# Patient Record
Sex: MALE | Race: WHITE | HISPANIC OR LATINO | URBAN - METROPOLITAN AREA
[De-identification: names, ages, dates, MRNs, and addresses within clinical notes are randomized per-mention and may not be internally consistent; named-entity substitution may affect disease eponyms.]

---

## 2017-04-19 ENCOUNTER — HOSPITAL ENCOUNTER (EMERGENCY)
Facility: MEDICAL CENTER | Age: 47
End: 2017-04-19
Attending: EMERGENCY MEDICINE
Payer: COMMERCIAL

## 2017-04-19 ENCOUNTER — APPOINTMENT (OUTPATIENT)
Dept: RADIOLOGY | Facility: MEDICAL CENTER | Age: 47
End: 2017-04-19
Attending: EMERGENCY MEDICINE
Payer: COMMERCIAL

## 2017-04-19 VITALS
DIASTOLIC BLOOD PRESSURE: 96 MMHG | RESPIRATION RATE: 16 BRPM | SYSTOLIC BLOOD PRESSURE: 151 MMHG | HEIGHT: 64 IN | WEIGHT: 161.6 LBS | OXYGEN SATURATION: 93 % | HEART RATE: 74 BPM | TEMPERATURE: 98.2 F | BODY MASS INDEX: 27.59 KG/M2

## 2017-04-19 DIAGNOSIS — S93.402A SPRAIN OF LEFT ANKLE, UNSPECIFIED LIGAMENT, INITIAL ENCOUNTER: ICD-10-CM

## 2017-04-19 PROCEDURE — 73610 X-RAY EXAM OF ANKLE: CPT | Mod: LT

## 2017-04-19 PROCEDURE — 99284 EMERGENCY DEPT VISIT MOD MDM: CPT

## 2017-04-19 ASSESSMENT — PAIN SCALES - GENERAL
PAINLEVEL_OUTOF10: 6
PAINLEVEL_OUTOF10: 2

## 2017-04-19 NOTE — DISCHARGE INSTRUCTIONS
Esguince de tobillo  (Ankle Sprain)   Un esguince de tobillo es miguelangel lesión en los tejidos ari y fibrosos (ligamentos) que mantienen unidos los huesos de la articulación del tobillo.   CAUSAS   Las causas pueden ser miguelangel caída o la torcedura del tobillo. Los esguinces de tobillo ocurren con más frecuencia al pisar con el borde exterior del pie, lo que hace que el tobillo se vuelva hacia adentro. Las personas que practican deportes son más propensas a pradeep tipo de lesiones.   SÍNTOMAS   · Dolor en el tobillo. El dolor puede aparecer laisha el reposo o sólo al tratar de ponerse de pie o caminar.  · Hinchazón.  · Hematomas. Los hematomas pueden aparecer inmediatamente o luego de 1 a 2 días después de la lesión.  · Dificultad para pararse o caminar, especialmente al doblar en esquinas o al cambiar de dirección.  DIAGNÓSTICO   El médico le preguntará detalles acerca de la lesión y le hará un examen físico del tobillo para determinar si tiene un esguince. Laisha el examen físico, el médico apretará y aplicará presión en áreas específicas del pie y del tobillo. El médico tratará de  el tobillo en ciertas direcciones. Le indicarán miguelangel radiografía para descartar la fractura de un hueso o que un ligamento no se haya separado de kym de los huesos del tobillo (fractura por avulsión).   TRATAMIENTO   Algunos tipos de soporte podrán ayudarlo a estabilizar el tobillo. El profesional que lo asiste le dará las indicaciones. También podrá indicarle que use medicamentos para calmar el dolor. Si el esguince es grave, mcleod médico podrá derivarlo a un cirujano que lo ayudará a recuperar la función de las partes afectadas del sistema esquelético (ortopedista) o a un fisioterapeuta.   INSTRUCCIONES PARA EL CUIDADO EN EL HOGAR   · Aplique hielo en la articulación lesionada laisha 1 ó 2 días o según lo que le indique mcleod médico. La aplicación del hielo ayuda a reducir la inflamación y el dolor.  ¨ Ponga el hielo en miguelangel bolsa  plástica.  ¨ Colóquese miguelangel toalla entre la piel y la bolsa de hielo.  ¨ Deje el hielo en el lugar laisha 15 a 20 minutos por vez, cada 2 horas mientras esté despierto.  · Sólo tome medicamentos de venta steve o recetados para calmar el dolor, las molestias o bajar la fiebre según las indicaciones de mcleod médico.  · Eleve el tobillo lesionado por encima del nivel del corazón tanto rajan pueda laisha 2 o 3 días.  · Si mcleod médico le indica el uso de muletas, úselas según las instrucciones. Gradualmente lleve el peso sobre el tobillo afectado. Siga usando muletas o un bastón hasta que pueda caminar sin sentir dolor en el tobillo.  · Si tiene miguelangel férula de yeso, úsela rajan lo indique mcleod médico. No se apoye en ninguna cosa más dura que miguelangel almohada laisha las primeras 24 horas. No ponga peso sobre la férula. No permita que se moje. Puede quitársela para jessika miguelangel ducha o un baño.  · Pueden haberle colocado un vendaje elástico para usar alrededor del tobillo para darle soporte. Si el vendaje elástico está muy ajustado (siente adormecimiento u hormigueo o el pie está frío y aleta), ajústelo para que sea más cómodo.  · Si usted tiene miguelangel férula de aire, puede soplar o dejar salir el aire para que sea más cómodo. Puede quitarse la férula por la noche y antes de jessika miguelangel ducha o un baño. Mueva los dedos de los pies en la férula varias veces al día para disminuir la hinchazón.  SOLICITE ATENCIÓN MÉDICA SI:   · Le aumenta rápidamente el moretón o el hinchazón.  · Los dedos de los pies están extremadamente fríos o pierde la sensibilidad en el pie.  · El dolor no se gladis con los medicamentos.  SOLICITE ATENCIÓN MÉDICA DE INMEDIATO SI:   · Los dedos de los pies están adormecidos o de color aleta.  · Tiene un dolor dangelo que va aumentando.  ASEGÚRESE DE QUE:   · Comprende estas instrucciones.  · Controlará mcleod enfermedad.  · Solicitará ayuda de inmediato si no mejora o empeora.     Esta información no tiene rajan fin reemplazar el  consejo del médico. Asegúrese de hacerle al médico cualquier pregunta que tenga.     Document Released: 12/18/2006 Document Revised: 09/11/2013  Elsevier Interactive Patient Education ©2016 Elsevier Inc.

## 2017-04-19 NOTE — ED AVS SNAPSHOT
Home Care Instructions                                                                                                                Jim Garcia   MRN: [id number]    Department:  [facility], Emergency Dept   Date of Visit:  4/19/2017            [facility], Emergency Dept    [address]    [place] [address]    Phone:  [phone]      You were seen by     Hung Lin M.D.      Your Diagnosis Was     Sprain of left ankle, unspecified ligament, initial encounter     S93.402A       Follow-up Information     1. Follow up with [email or web address] In 4 days.    Why:  for follow up of ankle injury    Contact information    [address]  [place] [address]  [phone]        Medication Information     Review all of your home medications and newly ordered medications with your primary doctor and/or pharmacist as soon as possible. Follow medication instructions as directed by your doctor and/or pharmacist.     Please keep your complete medication list with you and share with your physician. Update the information when medications are discontinued, doses are changed, or new medications (including over-the-counter products) are added; and carry medication information at all times in the event of emergency situations.               Medication List      Notice     You have not been prescribed any medications.            Procedures and tests performed during your visit     CRUTCHES    DX-ANKLE 3+ VIEWS LEFT    SPLINT APPLICATION        Discharge Instructions       Esguince de tobillo  (Ankle Sprain)   Un esguince de tobillo es miguelangel lesión en los tejidos ari y fibrosos (ligamentos) que mantienen unidos los huesos de la articulación del tobillo.   CAUSAS   Las causas pueden ser miguelangel caída o la torcedura del tobillo. Los esguinces de tobillo ocurren con más frecuencia al pisar con el borde exterior del pie, lo que hace que el tobillo se vuelva hacia adentro. Las personas que  practican deportes son más propensas a pradeep tipo de lesiones.   SÍNTOMAS   · Dolor en el tobillo. El dolor puede aparecer laisha el reposo o sólo al tratar de ponerse de pie o caminar.  · Hinchazón.  · Hematomas. Los hematomas pueden aparecer inmediatamente o luego de 1 a 2 días después de la lesión.  · Dificultad para pararse o caminar, especialmente al doblar en esquinas o al cambiar de dirección.  DIAGNÓSTICO   El médico le preguntará detalles acerca de la lesión y le hará un examen físico del tobillo para determinar si tiene un esguince. Laisha el examen físico, el médico apretará y aplicará presión en áreas específicas del pie y del tobillo. El médico tratará de  el tobillo en ciertas direcciones. Le indicarán miguelangel radiografía para descartar la fractura de un hueso o que un ligamento no se haya separado de kym de los huesos del tobillo (fractura por avulsión).   TRATAMIENTO   Algunos tipos de soporte podrán ayudarlo a estabilizar el tobillo. El profesional que lo asiste le dará las indicaciones. También podrá indicarle que use medicamentos para calmar el dolor. Si el esguince es grave, mcleod médico podrá derivarlo a un cirujano que lo ayudará a recuperar la función de las partes afectadas del sistema esquelético (ortopedista) o a un fisioterapeuta.   INSTRUCCIONES PARA EL CUIDADO EN EL HOGAR   · Aplique hielo en la articulación lesionada laisha 1 ó 2 días o según lo que le indique mcleod médico. La aplicación del hielo ayuda a reducir la inflamación y el dolor.  ¨ Ponga el hielo en miguelangel bolsa plástica.  ¨ Colóquese miguelangel toalla entre la piel y la bolsa de hielo.  ¨ Deje el hielo en el lugar laisha 15 a 20 minutos por vez, cada 2 horas mientras esté despierto.  · Sólo tome medicamentos de venta steve o recetados para calmar el dolor, las molestias o bajar la fiebre según las indicaciones de mcleod médico.  · Eleve el tobillo lesionado por encima del nivel del corazón tanto rajan pueda laisha 2 o 3 días.  · Si mcleod  médico le indica el uso de muletas, úselas según las instrucciones. Gradualmente lleve el peso sobre el tobillo afectado. Siga usando muletas o un bastón hasta que pueda caminar sin sentir dolor en el tobillo.  · Si tiene miguelangel férula de yeso, úsela rajan lo indique mcleod médico. No se apoye en ninguna cosa más dura que miguelangel almohada laisha las primeras 24 horas. No ponga peso sobre la férula. No permita que se moje. Puede quitársela para jessika miguelangel ducha o un baño.  · Pueden haberle colocado un vendaje elástico para usar alrededor del tobillo para darle soporte. Si el vendaje elástico está muy ajustado (siente adormecimiento u hormigueo o el pie está frío y aleta), ajústelo para que sea más cómodo.  · Si usted tiene miguelangel férula de aire, puede soplar o dejar salir el aire para que sea más cómodo. Puede quitarse la férula por la noche y antes de jessika miguelangel ducha o un baño. Mueva los dedos de los pies en la férula varias veces al día para disminuir la hinchazón.  SOLICITE ATENCIÓN MÉDICA SI:   · Le aumenta rápidamente el moretón o el hinchazón.  · Los dedos de los pies están extremadamente fríos o pierde la sensibilidad en el pie.  · El dolor no se gladis con los medicamentos.  SOLICITE ATENCIÓN MÉDICA DE INMEDIATO SI:   · Los dedos de los pies están adormecidos o de color aleta.  · Tiene un dolor dangelo que va aumentando.  ASEGÚRESE DE QUE:   · Comprende estas instrucciones.  · Controlará mcleod enfermedad.  · Solicitará ayuda de inmediato si no mejora o empeora.     Esta información no tiene rajan fin reemplazar el consejo del médico. Asegúrese de hacerle al médico cualquier pregunta que tenga.     Document Released: 12/18/2006 Document Revised: 09/11/2013  Elsevier Interactive Patient Education ©2016 Elsevier Inc.            Patient Information     Patient Information    Following emergency treatment: all patient requiring follow-up care must return either to a private physician or a clinic if your condition worsens before you are  able to obtain further medical attention, please return to the emergency room.     Billing Information    At [facility], we work to make the billing process streamlined for our patients.  Our Representatives are here to answer any questions you may have regarding your hospital bill.  If you have insurance coverage and have supplied your insurance information to us, we will submit a claim to your insurer on your behalf.  Should you have any questions regarding your bill, we can be reached online or by phone as follows:  Online: You are able pay your bills online or live chat with our representatives about any billing questions you may have. We are here to help Monday - Friday from 8:00am to 7:30pm and 9:00am - 12:00pm on Saturdays.  Please visit [email or web address]  for more information.   Phone:  [phone] or [phone]    Please note that your emergency physician, surgeon, pathologist, radiologist, anesthesiologist, and other specialists are not employed by [facility] and will therefore bill separately for their services.  Please contact them directly for any questions concerning their bills at the numbers below:     Emergency Physician Services:  [phone]  [facility]:  [phone]  [facility]:  [phone]  [facility]:  [phone]    1. Your final bill may vary from the amount quoted upon discharge if all procedures are not complete at that time, or if your doctor has additional procedures of which we are not aware. You will receive an additional bill if you return to the Emergency Department at [facility] for suture removal regardless of the facility of which the sutures were placed.     2. Please arrange for settlement of this account at the emergency registration.    3. All self-pay accounts are due in full at the time of treatment.  If you are unable to meet this obligation then payment is expected within 4-5  days.     4. If you have had radiology studies (CT, X-ray, Ultrasound, MRI), you have received a preliminary result during your emergency department visit. Please contact the radiology department [phone] to receive a copy of your final result. Please discuss the Final result with your primary physician or with the follow up physician provided.     Crisis Hotline:  [place] Crisis Hotline:  0-376-UHRYUFE or 1-957.982.8921  [place] Crisis Hotline:    1-873.935.6540 or 764-047-1843         ED Discharge Follow Up Questions    1. In order to provide you with very good care, we would like to follow up with a phone call in the next few days.  May we have your permission to contact you?     YES /  NO    2. What is the best phone number to call you? (       )_____-__________    3. What is the best time to call you?      Morning  /  Afternoon  /  Evening                   Patient Signature:  ____________________________________________________________    Date:  ____________________________________________________________

## 2017-04-19 NOTE — ED NOTES
.  Chief Complaint   Patient presents with   • Ankle Injury     left ankle injury 4/18   • Ankle Swelling     Ambulated to triage with son above c/c. Twisted ankle at work yesterday.

## 2017-04-19 NOTE — ED AVS SNAPSHOT
Ecal Access Code: [email or web address]  Expires: 5/19/2017  3:34 PM    Your email address is not on file at [facility].  Email Addresses are required for you to sign up for Ecal, please contact [phone] to verify your personal information and to provide your email address prior to attempting to register for Ecal.    Jim Garcia  [address] [place] [address]    Ecal  A secure, online tool to manage your health information     [facility]’s Ecal® is a secure, online tool that connects you to your personalized health information from the privacy of your home -- day or night - making it very easy for you to manage your healthcare. Once the activation process is completed, you can even access your medical information using the Ecal farrah, which is available for free in the Apple Farrah store or Google Play store.     To learn more about Ecal, visit [email or web address]    There are two levels of access available (as shown below):   My Chart Features  [facility] Primary Care Doctor [facility]  Specialists [facility]  Urgent  Care Non-[facility] Primary Care Doctor   Email your healthcare team securely and privately 24/7 X X X    Manage appointments: schedule your next appointment; view details of past/upcoming appointments X      Request prescription refills. X      View recent personal medical records, including lab and immunizations X X X X   View health record, including health history, allergies, medications X X X X   Read reports about your outpatient visits, procedures, consult and ER notes X X X X   See your discharge summary, which is a recap of your hospital and/or ER visit that includes your diagnosis, lab results, and care plan X X  X     How to register for Sabret:  Once your e-mail address has been verified, follow the following steps to sign up for Sabret.     1. Go to  [email or web address]  2. Click on the Sign Up Now box, which takes you to the New Member Sign Up page. You  will need to provide the following information:  a. Enter your 20:20 Mobile Access Code exactly as it appears at the top of this page. (You will not need to use this code after you’ve completed the sign-up process. If you do not sign up before the expiration date, you must request a new code.)   b. Enter your date of birth.   c. Enter your home email address.   d. Click Submit, and follow the next screen’s instructions.  3. Create a Kagerat ID. This will be your 20:[employer] Mobile login ID and cannot be changed, so think of one that is secure and easy to remember.  4. Create a 20:20 Mobile password. You can change your password at any time.  5. Enter your Password Reset Question and Answer. This can be used at a later time if you forget your password.   6. Enter your e-mail address. This allows you to receive e-mail notifications when new information is available in 20:[employer] Mobile.  7. Click Sign Up. You can now view your health information.    For assistance activating your 20:[employer] Mobile account, call [phone]

## 2017-04-19 NOTE — ED NOTES
Explained discharge instructions to patient and family, family translating, all questions answered.  Patient encouraged to follow up with PCP, and return to the ER for worsening symptoms.  VSS upon discharge, MD aware of HTN.  Patient ambulated to lobby with steady gait using crutches/boot in place.  Patient confirmed that he had a safe way to get home.

## 2017-04-19 NOTE — ED AVS SNAPSHOT
4/19/2017    Jim Garcia  [address] [place] [address]    Dear Jim:    On license of [facility] wants to ensure your discharge home is safe and you or your loved ones have had all of your questions answered regarding your care after you leave the hospital.    Below is a list of resources and contact information should you have any questions regarding your hospital stay, follow-up instructions, or active medical symptoms.    Questions or Concerns Regarding… Contact   Medical Questions Related to Your Discharge  (7 days a week, 8am-5pm) Contact a Nurse Care Coordinator   [phone]   Medical Questions Not Related to Your Discharge  (24 hours a day / 7 days a week)  Contact the Nurse Health Line   [phone]    Medications or Discharge Instructions Refer to your discharge packet   or contact your [facility] Primary Care Provider   [phone]   Follow-up Appointment(s) Schedule your appointment via [employer]   or contact Scheduling [phone]   Billing Review your statement via [employer]us  or contact Billing [phone]   Medical Records Review your records via [employer]us   or contact Medical Records [phone]     You may receive a telephone call within two days of discharge. This call is to make certain you understand your discharge instructions and have the opportunity to have any questions answered. You can also easily access your medical information, test results and upcoming appointments via the [employer]us free online health management tool. You can learn more and sign up at [email or web address]. For assistance setting up your [employer] account, please call [phone].    Once again, we want to ensure your discharge home is safe and that you have a clear understanding of any next steps in your care. If you have any questions or concerns, please do not hesitate to contact us, we are here for you. Thank you for choosing [facility] for your healthcare needs.    Sincerely,    Your [facility] Healthcare Team

## 2017-04-19 NOTE — LETTER
"  FORM C-4:  EMPLOYEE’S CLAIM FOR COMPENSATION/ REPORT OF INITIAL TREATMENT  EMPLOYEE’S CLAIM - PROVIDE ALL INFORMATION REQUESTED   First Name  Jim Last Name  Jose Birthdate             Age  1970 46 y.o. Sex  male Claim Number   Home Employee Address  [address]                                     Zip  [address] Height  1.626 m (5' 4\") Weight  73.3 kg (161 lb 9.6 oz) N  [phone]   Mailing Employee Address                           [address]               Zip  [address] Telephone  [phone] (home)  Primary Language Spoken  ENGLISH   Insurer  UNABLE TO OBTAIN Third Party   [employer] Employee's Occupation (Job Title) When Injury or Occupational Disease Occurred       Employer's Name  [employer] Telephone  [phone]    Employer Address  [address] Zip  [address]   Date of Injury  4/19/2017       Hour of Injury  10:00 AM Date Employer Notified  4/19/2017 Last Day of Work after Injury or Occupational Disease  4/19/2017 Supervisor to Whom Injury Reported  [facility]   Address or Location of Accident (if applicable)  UNKNOWN   What were you doing at the time of accident? (if applicable)  WORKING    How did this injury or occupational disease occur? Be specific and answer in detail. Use additional sheet if necessary)  STEPPED INCORECTLY ON CONCRETE SLAB A TWISTED ANKLE   If you believe that you have an occupational disease, when did you first have knowledge of the disability and it relationship to your employment?  NA Witnesses to the Accident  NA     Nature of Injury or Occupational Disease  Sprain  Part(s) of Body Injured or Affected  Ankle (L), N/A, N/A    I certify that the above is true and correct to the best of my knowledge and that I have provided this information in order to obtain the benefits of [place]’s [place] " [place] inclusive or Chapter 617 of [place]RS).  I hereby authorize any physician, chiropractor, surgeon, practitioner, or other person, any hospital, including [facility] hospital, any medical service organization, any insurance company, or other institution or organization to release to each other, any medical or other information, including benefits paid or payable, pertinent to this injury or disease, except information relative to diagnosis, treatment and/or counseling for AIDS, psychological conditions, alcohol or controlled substances, for which I must give specific authorization.  A Photostat of this authorization shall be as valid as the original.   Date Place  [facility] Employee’s Signature   THIS REPORT MUST BE COMPLETED AND MAILED WITHIN 3 WORKING DAYS OF TREATMENT   Place  [facility], EMERGENCY DEPT  Name of Facility   [facility]   Date  4/19/2017 Diagnosis  (S93.402A) Sprain of left ankle, unspecified ligament, initial encounter Is there evidence the injured employee was under the influence of alcohol and/or another controlled substance at the time of accident?   Hour  2:40 PM Description of Injury or Disease  Sprain of left ankle, unspecified ligament, initial encounter No   Treatment  Physician evaluation and treatment of left ankle sprain  Have you advised the patient to remain off work five days or more?         No   X-Ray Findings  Negative  Comments:no fracture to the left ankle   If Yes   From Date    To Date      From information given by the employee, together with medical evidence, can you directly connect this injury or occupational disease as job incurred?  Yes If No, is the employee capable of: Full Duty  No Modified Duty  Yes   Is additional medical care by a physician indicated?  Yes  Comments:follow-up with occupational health If Modified Duty, Specify any Limitations / Restrictions  No  "full weight-bearing on left ankle until cleared by occupational health     Do you know of any previous injury or disease contributing to this condition or occupational disease?  No   Date  4/19/2017 Print Doctor’s Name  Hung Lin I certify the employer’s copy of this form was mailed on:   Address  [address] [address] [phone] Insurer’s Use Only   [facility]  [address]    Provider’s Tax ID Number  [id number] Telephone  Dept: [phone]    Doctor’s Signature  e-HUNG Martinez M.D. Degree   MD    Original - TREATING PHYSICIAN OR CHIROPRACTOR   Pg 2-Insurer/TPA   Pg 3-Employer   Pg 4-Employee                                                                                                  Form C-4 (rev01/03)     BRIEF DESCRIPTION OF RIGHTS AND BENEFITS  (Pursuant to NRS 616C.050)    Notice of Injury or Occupational Disease (Incident Report Form C-1): If an injury or occupational disease (OD) arises out of and in the course of employment, you must provide written notice to your employer as soon as practicable, but no later than 7 days after the accident or OD. Your employer shall maintain a sufficient supply of the required forms.    Claim for Compensation (Form C-4): If medical treatment is sought, the form C-4 is available at the place of initial treatment. A completed \"Claim for Compensation\" (Form C-4) must be filed within 90 days after an accident or OD. The treating physician or chiropractor must, within 3 working days after treatment, complete and mail to the employer, the employer's insurer and third-party , the Claim for Compensation.    Medical Treatment: If you require medical treatment for your on-the-job injury or OD, you may be required to select a physician or chiropractor from a list provided by your workers’ compensation insurer, if it has contracted with an Organization for Managed Care (MCO) or Preferred Provider Organization (PPO) or " providers of health care. If your employer has not entered into a contract with an MCO or PPO, you may select a physician or chiropractor from the Panel of Physicians and Chiropractors. Any medical costs related to your industrial injury or OD will be paid by your insurer.    Temporary Total Disability (TTD): If your doctor has certified that you are unable to work for a period of at least 5 consecutive days, or 5 cumulative days in a 20-day period, or places restrictions on you that your employer does not accommodate, you may be entitled to TTD compensation.    Temporary Partial Disability (TPD): If the wage you receive upon reemployment is less than the compensation for TTD to which you are entitled, the insurer may be required to pay you TPD compensation to make up the difference. TPD can only be paid for a maximum of 24 months.    Permanent Partial Disability (PPD): When your medical condition is stable and there is an indication of a PPD as a result of your injury or OD, within 30 days, your insurer must arrange for an evaluation by a rating physician or chiropractor to determine the degree of your PPD. The amount of your PPD award depends on the date of injury, the results of the PPD evaluation and your age and wage.    Permanent Total Disability (PTD): If you are medically certified by a treating physician or chiropractor as permanently and totally disabled and have been granted a PTD status by your insurer, you are entitled to receive monthly benefits not to exceed 66 2/3% of your average monthly wage. The amount of your PTD payments is subject to reduction if you previously received a PPD award.    Vocational Rehabilitation Services: You may be eligible for vocational rehabilitation services if you are unable to return to the job due to a permanent physical impairment or permanent restrictions as a result of your injury or occupational disease.    Transportation and Per Deborah Reimbursement: You may be  eligible for travel expenses and per jason associated with medical treatment.  Reopening: You may be able to reopen your claim if your condition worsens after claim closure.    Appeal Process: If you disagree with a written determination issued by the insurer or the insurer does not respond to your request, you may appeal to the Department of Administration, , by following the instructions contained in your determination letter. You must appeal the determination within 70 days from the date of the determination letter at [address], [address], [place] [address], or [address], [place], [place] [address]. If you disagree with the  decision, you may appeal to the Department of Administration, . You must file your appeal within 30 days from the date of the  decision letter at [address], [address], [place], [place] [address], or [address] [facility] [address], [place], [place] [address]. If you disagree with a decision of an , you may file a petition for judicial review with the District Court. You must do so within 30 days of the Appeal Officer’s decision. You may be represented by an  at your own expense or you may contact the [facility] for possible representation.    [place]  for Injured Workers (NAIW): If you disagree with a  decision, you may request that NAIW represent you without charge at an  Hearing. For information regarding denial of benefits, you may contact the [facility] at: [address], [address], [place] [address], [phone], or [address], [place] [address], [phone]    To File a Complaint with the Division: If you wish to file a complaint with the  of the Division of Industrial Relations (DIR), please contact the Workers’ Compensation Section, [address], [address], [place],  [place] [address], telephone [phone], or [address], [address], [place] [address], telephone [phone].    For assistance with Workers’ Compensation Issues: you may contact the Office of the Governor Consumer Health Assistance, [address] [place] [address], [address], [place], [place] [address], Toll Free 1-241.255.1423, Web site: http://[facility].[email or web address]., E-mail [email or web address].                                                                                                                                                                               __________________________________________________________________                                    _________________            Employee Name / Signature                                                                                                                            Date                                       D-2 (rev. 10/07)

## 2017-04-19 NOTE — LETTER
"  FORM C-4:  EMPLOYEE’S CLAIM FOR COMPENSATION/ REPORT OF INITIAL TREATMENT  EMPLOYEE’S CLAIM - PROVIDE ALL INFORMATION REQUESTED   First Name  Jim Last Name  Jose Birthdate             Age  1970 46 y.o. Sex  male Claim Number   Home Employee Address  [address]                                     Zip  [address] Height  1.626 m (5' 4\") Weight  73.3 kg (161 lb 9.6 oz) N  [phone]   Mailing Employee Address                           [address]               Zip  [address] Telephone  [phone] (home)  Primary Language Spoken  ENGLISH   Insurer  UNABLE TO OBTAIN Third Party   [employer] Employee's Occupation (Job Title) When Injury or Occupational Disease Occurred     Employer's Name  [employer] Telephone   [phone]   Employer Address  [address] Zip  [address]   Date of Injury  4/19/2017       Hour of Injury  10:00 AM Date Employer Notified  4/19/2017 Last Day of Work after Injury or Occupational Disease  4/19/2017 Supervisor to Whom Injury Reported  [facility]   Address or Location of Accident (if applicable)  UNKNOWN   What were you doing at the time of accident? (if applicable)  WORKING    How did this injury or occupational disease occur? Be specific and answer in detail. Use additional sheet if necessary)  STEPPED INCORECTLY ON CONCRETE SLAB A TWISTED ANKLE   If you believe that you have an occupational disease, when did you first have knowledge of the disability and it relationship to your employment?  NA Witnesses to the Accident  NA     Nature of Injury or Occupational Disease  Sprain  Part(s) of Body Injured or Affected  Ankle (L), N/A, N/A    I certify that the above is true and correct to the best of my knowledge and that I have provided this information in order to obtain the benefits of [place]’s [place] and Occup[place] " [place] Acts ([place] 616[place], inclusive or Chapter 617 of [place]RS).  I hereby authorize any physician, chiropractor, surgeon, practitioner, or other person, any hospital, including [facility] hospital, any medical service organization, any insurance company, or other institution or organization to release to each other, any medical or other information, including benefits paid or payable, pertinent to this injury or disease, except information relative to diagnosis, treatment and/or counseling for AIDS, psychological conditions, alcohol or controlled substances, for which I must give specific authorization.  A Photostat of this authorization shall be as valid as the original.   Date Place  [facility] Employee’s Signature   THIS REPORT MUST BE COMPLETED AND MAILED WITHIN 3 WORKING DAYS OF TREATMENT   Place  [facility], EMERGENCY DEPT  Name of Facility   [facility]   Date  4/19/2017 Diagnosis  (S93.402A) Sprain of left ankle, unspecified ligament, initial encounter Is there evidence the injured employee was under the influence of alcohol and/or another controlled substance at the time of accident?   Hour  2:51 PM Description of Injury or Disease  Sprain of left ankle, unspecified ligament, initial encounter No   Treatment  Physician evaluation and treatment of left ankle sprain  Have you advised the patient to remain off work five days or more?         No   X-Ray Findings  Negative  Comments:no fracture to the left ankle   If Yes   From Date    To Date      From information given by the employee, together with medical evidence, can you directly connect this injury or occupational disease as job incurred?  Yes If No, is the employee capable of: Full Duty  No Modified Duty  Yes   Is additional medical care by a physician indicated?  Yes  Comments:follow-up with occupational health If Modified Duty, Specify any Limitations /  "Restrictions  No full weight-bearing on left ankle until cleared by occupational health     Do you know of any previous injury or disease contributing to this condition or occupational disease?  No   Date  4/19/2017 Print Doctor’s Name  Hung Lin I certify the employer’s copy of this form was mailed on:   Address  [address] [place] [address] [phone] Insurer’s Use Only   [facility]  [address]    Provider’s Tax ID Number  [id number] Telephone  Dept: [phone]    Doctor’s Signature  e-HUNG Martinez M.D. Degree   MD    Original - TREATING PHYSICIAN OR CHIROPRACTOR   Pg 2-Insurer/TPA   Pg 3-Employer   Pg 4-Employee                                                                                                  Form C-4 (rev01/03)     BRIEF DESCRIPTION OF RIGHTS AND BENEFITS  ([place]ursuant to [place]RS 616C.050)    Notice of Injury or Occupational Disease (Incident Report Form C-1): If an injury or occupational disease (OD) arises out of and in the course of employment, you must provide written notice to your employer as soon as practicable, but no later than 7 days after the accident or OD. Your employer shall maintain a sufficient supply of the required forms.    Claim for Compensation (Form C-4): If medical treatment is sought, the form C-4 is available at the place of initial treatment. A completed \"Claim for Compensation\" (Form C-4) must be filed within 90 days after an accident or OD. The treating physician or chiropractor must, within 3 working days after treatment, complete and mail to the employer, the employer's insurer and third-party , the Claim for Compensation.    Medical Treatment: If you require medical treatment for your on-the-job injury or OD, you may be required to select a physician or chiropractor from a list provided by your workers’ compensation insurer, if it has contracted with an Organization for Managed Care (MCO) or Preferred Provider " Organization (PPO) or providers of health care. If your employer has not entered into a contract with an MCO or PPO, you may select a physician or chiropractor from the Panel of Physicians and Chiropractors. Any medical costs related to your industrial injury or OD will be paid by your insurer.    Temporary Total Disability (TTD): If your doctor has certified that you are unable to work for a period of at least 5 consecutive days, or 5 cumulative days in a 20-day period, or places restrictions on you that your employer does not accommodate, you may be entitled to TTD compensation.    Temporary Partial Disability (TPD): If the wage you receive upon reemployment is less than the compensation for TTD to which you are entitled, the insurer may be required to pay you TPD compensation to make up the difference. TPD can only be paid for a maximum of 24 months.    Permanent Partial Disability (PPD): When your medical condition is stable and there is an indication of a PPD as a result of your injury or OD, within 30 days, your insurer must arrange for an evaluation by a rating physician or chiropractor to determine the degree of your PPD. The amount of your PPD award depends on the date of injury, the results of the PPD evaluation and your age and wage.    Permanent Total Disability (PTD): If you are medically certified by a treating physician or chiropractor as permanently and totally disabled and have been granted a PTD status by your insurer, you are entitled to receive monthly benefits not to exceed 66 2/3% of your average monthly wage. The amount of your PTD payments is subject to reduction if you previously received a PPD award.    Vocational Rehabilitation Services: You may be eligible for vocational rehabilitation services if you are unable to return to the job due to a permanent physical impairment or permanent restrictions as a result of your injury or occupational disease.    Transportation and Per Deborah  Reimbursement: You may be eligible for travel expenses and per jason associated with medical treatment.  Reopening: You may be able to reopen your claim if your condition worsens after claim closure.    Appeal Process: If you disagree with a written determination issued by the insurer or the insurer does not respond to your request, you may appeal to the Department of Administration, , by following the instructions contained in your determination letter. You must appeal the determination within 70 days from the date of the determination letter at [address], [address], [place], [place] [address], or [address], [address], [place], [place] [address]. If you disagree with the  decision, you may appeal to the Department of Administration, . You must file your appeal within 30 days from the date of the  decision letter at [address], [address], [place], [place] [address], or [address], [facility] [address], [place], [place] [address]. If you disagree with a decision of an , you may file a petition for judicial review with the District Court. You must do so within 30 days of the Appeal Officer’s decision. You may be represented by an  at your own expense or you may contact the [facility] for possible representation.    [place]  for Injured Workers (NAIW): If you disagree with a  decision, you may request that NAIW represent you without charge at an  Hearing. For information regarding denial of benefits, you may contact the [facility] at: [address], [address], [place] [address], [phone], or [address], [facility] [address], [place] [address], [phone]    To File a Complaint with the Division: If you wish to file a complaint with the  of the Division of Industrial Relations (DIR), please contact the Workers’ Compensation Section, [address] [place] [address]  [place], [address], [place], [place] [address], telephone ([phone]) [phone], or [address], [address], [place], [place] [address], telephone [phone].    For assistance with Workers’ Compensation Issues: you may contact the Office of the Governor Consumer Health Assistance, [address] [place] [address], [address], [place], [place] [address], Toll Free 1-350.747.9398, Web site: http://govcha.[facility].nv., E-mail [email or web address].                                                                                                                                                                               __________________________________________________________________                                    _________________            Employee Name / Signature                                                                                                                            Date                                       D-2 (rev. 10/07)

## 2017-04-19 NOTE — ED NOTES
Elevated ankle on blankets.  Provided with ice pack.  Patient Cymro speaking only, translated by family at bedside.

## 2017-04-19 NOTE — ED PROVIDER NOTES
"ED Provider Note    CHIEF COMPLAINT  Chief Complaint   Patient presents with   • Ankle Injury     left ankle injury 4/18   • Ankle Swelling       HPI  Jim Garcia is a 46 y.o. male who presents for evaluation of left ankle injury. The patient was apparently at work, took a misstep and twisted his left ankle. Over the last 24 hours become progressively more painful and swollen. Denies any injury to the head neck chest abdomen or pelvis. He is otherwise healthy. No significant medical or surgical history. Patient denies any other symptoms    REVIEW OF SYSTEMS  See HPI for further details. No numbness weakness or tingling All other systems are negative.     PAST MEDICAL HISTORY  No past medical history on file.  None reported  FAMILY HISTORY  Noncontributory    SOCIAL HISTORY  Social History     Social History   • Marital Status: N/A     Spouse Name: N/A   • Number of Children: N/A   • Years of Education: N/A     Social History Main Topics   • Smoking status: Not on file   • Smokeless tobacco: Not on file   • Alcohol Use: Not on file   • Drug Use: Not on file   • Sexual Activity: Not on file     Other Topics Concern   • Not on file     Social History Narrative   • No narrative on file     Daily alcohol no IV drugs    SURGICAL HISTORY  No past surgical history on file.  Umbilical hernia repair  CURRENT MEDICATIONS  Home Medications     **Home medications have not yet been reviewed for this encounter**          ALLERGIES  No Known Allergies    PHYSICAL EXAM  VITAL SIGNS: /91 mmHg  Pulse 81  Temp(Src) 36.8 °C (98.2 °F) (Temporal)  Resp 16  Ht 1.626 m (5' 4\")  Wt 73.3 kg (161 lb 9.6 oz)  BMI 27.72 kg/m2  SpO2 95%      Constitutional: Well developed, Well nourished, No acute distress, Non-toxic appearance.   HENT: Normocephalic, Atraumatic, Bilateral external ears normal, Oropharynx moist, No oral exudates, Nose normal.   Eyes: PERRLA, EOMI, Conjunctiva normal, No discharge.   Neck: Normal range of motion, " No tenderness, Supple, No stridor.    Cardiovascular: Normal heart rate, Normal rhythm, No murmurs, No rubs, No gallops.   Thorax & Lungs: Normal breath sounds, No respiratory distress, No wheezing, No chest tenderness.   Abdomen: Bowel sounds normal, Soft, No tenderness, No masses, No pulsatile masses.   Skin: Warm, Dry, No erythema, No rash.   Extremities: Significant soft tissue swelling circumfrential tenderness noted around the left ankle tenderness noted over the lateral medial malleolus. No midfoot instability distal neurovascular exam is normal   Neurologic: Alert & oriented x 3, Normal motor function, Normal sensory function, No focal deficits noted.   Psychiatric: Affect normal, Judgment normal, Mood normal.         RADIOLOGY/PROCEDURES  DX-ANKLE 3+ VIEWS LEFT   Final Result      No acute fracture.            COURSE & MEDICAL DECISION MAKING  Pertinent Labs & Imaging studies reviewed. (See chart for details)  The patient declined any pain medication. Here he has no evidence of fracture dislocation of the left ankle. He doesn't high-grade sprain. I will place in an orthopedic boot and crutches. Recommended rest ice and NSAIDs. He will be referred to occupational health for clearance to go back to work in 3-4 days.    FINAL IMPRESSION  1.  1. Sprain of left ankle, unspecified ligament, initial encounter          Electronically signed by: Hung Lin, 4/19/2017 1:15 PM